# Patient Record
Sex: MALE | Race: WHITE | Employment: FULL TIME | URBAN - METROPOLITAN AREA
[De-identification: names, ages, dates, MRNs, and addresses within clinical notes are randomized per-mention and may not be internally consistent; named-entity substitution may affect disease eponyms.]

---

## 2020-09-26 ENCOUNTER — LAB ENCOUNTER (OUTPATIENT)
Dept: LAB | Age: 60
End: 2020-09-26
Attending: ORTHOPAEDIC SURGERY
Payer: COMMERCIAL

## 2020-09-26 DIAGNOSIS — Z01.818 PRE-OP TESTING: ICD-10-CM

## 2020-09-26 LAB
ANTIBODY SCREEN: NEGATIVE
RH BLOOD TYPE: POSITIVE

## 2020-09-26 PROCEDURE — 86900 BLOOD TYPING SEROLOGIC ABO: CPT

## 2020-09-26 PROCEDURE — 86901 BLOOD TYPING SEROLOGIC RH(D): CPT

## 2020-09-26 PROCEDURE — 86850 RBC ANTIBODY SCREEN: CPT

## 2020-09-27 LAB — SARS-COV-2 RNA RESP QL NAA+PROBE: NOT DETECTED

## 2020-09-28 NOTE — H&P
[address] [place] [address] Patient Status:  Outpatient in a Bed    1960 MRN [id number]   Location [place] [address] Attending [address] [facility] Day # 0 PCP No primary care pro cooperative  Extremities: He walks with a markedly antalgic gait on the left knee. He has a varus deformity of 5 degrees.   He has full extension 125 degrees of flexion with a negative Lockman's test.  There is no collateral instability but his varus defor

## 2020-09-29 ENCOUNTER — ANESTHESIA EVENT (OUTPATIENT)
Dept: SURGERY | Facility: HOSPITAL | Age: 60
End: 2020-09-29
Payer: COMMERCIAL

## 2020-09-29 ENCOUNTER — HOSPITAL ENCOUNTER (OUTPATIENT)
Facility: HOSPITAL | Age: 60
Discharge: HOME | End: 2020-09-30
Attending: ORTHOPAEDIC SURGERY | Admitting: ORTHOPAEDIC SURGERY
Payer: COMMERCIAL

## 2020-09-29 ENCOUNTER — ANESTHESIA (OUTPATIENT)
Dept: SURGERY | Facility: HOSPITAL | Age: 60
End: 2020-09-29
Payer: COMMERCIAL

## 2020-09-29 ENCOUNTER — APPOINTMENT (OUTPATIENT)
Dept: GENERAL RADIOLOGY | Facility: HOSPITAL | Age: 60
End: 2020-09-29
Attending: PHYSICIAN ASSISTANT
Payer: COMMERCIAL

## 2020-09-29 DIAGNOSIS — Z01.818 PRE-OP TESTING: Primary | ICD-10-CM

## 2020-09-29 PROCEDURE — 99202 OFFICE O/P NEW SF 15 MIN: CPT | Performed by: HOSPITALIST

## 2020-09-29 PROCEDURE — 73560 X-RAY EXAM OF KNEE 1 OR 2: CPT | Performed by: PHYSICIAN ASSISTANT

## 2020-09-29 PROCEDURE — 0SRD0L9 REPLACEMENT OF LEFT KNEE JOINT WITH MEDIAL UNICONDYLAR SYNTHETIC SUBSTITUTE, CEMENTED, OPEN APPROACH: ICD-10-PCS | Performed by: ORTHOPAEDIC SURGERY

## 2020-09-29 DEVICE — UNI KNEE CAP PRICING: Type: IMPLANTABLE DEVICE | Status: FUNCTIONAL

## 2020-09-29 DEVICE — REFOBACIN BC R 1X40 US: Type: IMPLANTABLE DEVICE | Site: KNEE | Status: FUNCTIONAL

## 2020-09-29 RX ORDER — HYDROMORPHONE HYDROCHLORIDE 1 MG/ML
0.2 INJECTION, SOLUTION INTRAMUSCULAR; INTRAVENOUS; SUBCUTANEOUS EVERY 5 MIN PRN
Status: DISCONTINUED | OUTPATIENT
Start: 2020-09-29 | End: 2020-09-29 | Stop reason: HOSPADM

## 2020-09-29 RX ORDER — DIPHENHYDRAMINE HYDROCHLORIDE 50 MG/ML
25 INJECTION INTRAMUSCULAR; INTRAVENOUS ONCE AS NEEDED
Status: ACTIVE | OUTPATIENT
Start: 2020-09-29 | End: 2020-09-29

## 2020-09-29 RX ORDER — DOCUSATE SODIUM 100 MG/1
100 CAPSULE, LIQUID FILLED ORAL 2 TIMES DAILY
Status: DISCONTINUED | OUTPATIENT
Start: 2020-09-29 | End: 2020-09-30

## 2020-09-29 RX ORDER — MELATONIN
325
Status: DISCONTINUED | OUTPATIENT
Start: 2020-09-29 | End: 2020-09-29

## 2020-09-29 RX ORDER — ASPIRIN 325 MG
325 TABLET ORAL 2 TIMES DAILY
Status: DISCONTINUED | OUTPATIENT
Start: 2020-09-29 | End: 2020-09-30

## 2020-09-29 RX ORDER — HYDROCODONE BITARTRATE AND ACETAMINOPHEN 5; 325 MG/1; MG/1
2 TABLET ORAL AS NEEDED
Status: COMPLETED | OUTPATIENT
Start: 2020-09-29 | End: 2020-09-29

## 2020-09-29 RX ORDER — HYDROCODONE BITARTRATE AND ACETAMINOPHEN 5; 325 MG/1; MG/1
2 TABLET ORAL EVERY 4 HOURS PRN
Status: DISCONTINUED | OUTPATIENT
Start: 2020-09-29 | End: 2020-09-30

## 2020-09-29 RX ORDER — ONDANSETRON 2 MG/ML
4 INJECTION INTRAMUSCULAR; INTRAVENOUS EVERY 4 HOURS PRN
Status: DISCONTINUED | OUTPATIENT
Start: 2020-09-29 | End: 2020-09-30

## 2020-09-29 RX ORDER — LIDOCAINE HYDROCHLORIDE 10 MG/ML
INJECTION, SOLUTION INFILTRATION; PERINEURAL
Status: COMPLETED | OUTPATIENT
Start: 2020-09-29 | End: 2020-09-29

## 2020-09-29 RX ORDER — MAGNESIUM HYDROXIDE 1200 MG/15ML
LIQUID ORAL CONTINUOUS PRN
Status: COMPLETED | OUTPATIENT
Start: 2020-09-29 | End: 2020-09-29

## 2020-09-29 RX ORDER — HYDROMORPHONE HYDROCHLORIDE 1 MG/ML
0.4 INJECTION, SOLUTION INTRAMUSCULAR; INTRAVENOUS; SUBCUTANEOUS EVERY 5 MIN PRN
Status: DISCONTINUED | OUTPATIENT
Start: 2020-09-29 | End: 2020-09-29 | Stop reason: HOSPADM

## 2020-09-29 RX ORDER — BUPIVACAINE HYDROCHLORIDE 7.5 MG/ML
INJECTION, SOLUTION INTRASPINAL
Status: COMPLETED | OUTPATIENT
Start: 2020-09-29 | End: 2020-09-29

## 2020-09-29 RX ORDER — CELECOXIB 200 MG/1
200 CAPSULE ORAL EVERY 12 HOURS SCHEDULED
Qty: 60 CAPSULE | Refills: 0 | Status: SHIPPED | OUTPATIENT
Start: 2020-09-29 | End: 2021-06-25

## 2020-09-29 RX ORDER — NALOXONE HYDROCHLORIDE 0.4 MG/ML
80 INJECTION, SOLUTION INTRAMUSCULAR; INTRAVENOUS; SUBCUTANEOUS AS NEEDED
Status: ACTIVE | OUTPATIENT
Start: 2020-09-29 | End: 2020-09-29

## 2020-09-29 RX ORDER — VANCOMYCIN HYDROCHLORIDE
15 ONCE
Status: DISCONTINUED | OUTPATIENT
Start: 2020-09-29 | End: 2020-09-29

## 2020-09-29 RX ORDER — DIPHENHYDRAMINE HYDROCHLORIDE 50 MG/ML
12.5 INJECTION INTRAMUSCULAR; INTRAVENOUS EVERY 4 HOURS PRN
Status: DISCONTINUED | OUTPATIENT
Start: 2020-09-29 | End: 2020-09-30

## 2020-09-29 RX ORDER — HYDROCODONE BITARTRATE AND ACETAMINOPHEN 5; 325 MG/1; MG/1
1 TABLET ORAL EVERY 4 HOURS PRN
Qty: 50 TABLET | Refills: 0 | Status: SHIPPED | OUTPATIENT
Start: 2020-09-29 | End: 2020-10-15

## 2020-09-29 RX ORDER — POLYETHYLENE GLYCOL 3350 17 G/17G
17 POWDER, FOR SOLUTION ORAL DAILY PRN
Status: DISCONTINUED | OUTPATIENT
Start: 2020-09-29 | End: 2020-09-30

## 2020-09-29 RX ORDER — MELATONIN
325
Qty: 10 TABLET | Refills: 0 | Status: SHIPPED | OUTPATIENT
Start: 2020-09-29

## 2020-09-29 RX ORDER — CEFAZOLIN SODIUM/WATER 2 G/20 ML
2 SYRINGE (ML) INTRAVENOUS ONCE
Status: COMPLETED | OUTPATIENT
Start: 2020-09-29 | End: 2020-09-29

## 2020-09-29 RX ORDER — GABAPENTIN 600 MG/1
600 TABLET ORAL ONCE
Status: COMPLETED | OUTPATIENT
Start: 2020-09-29 | End: 2020-09-29

## 2020-09-29 RX ORDER — BISACODYL 10 MG
10 SUPPOSITORY, RECTAL RECTAL
Status: DISCONTINUED | OUTPATIENT
Start: 2020-09-29 | End: 2020-09-30

## 2020-09-29 RX ORDER — FAMOTIDINE 20 MG/1
20 TABLET ORAL ONCE
Status: DISCONTINUED | OUTPATIENT
Start: 2020-09-29 | End: 2020-09-29

## 2020-09-29 RX ORDER — PHENYLEPHRINE HCL 10 MG/ML
VIAL (ML) INJECTION AS NEEDED
Status: DISCONTINUED | OUTPATIENT
Start: 2020-09-29 | End: 2020-09-29 | Stop reason: SURG

## 2020-09-29 RX ORDER — MIDAZOLAM HYDROCHLORIDE 1 MG/ML
INJECTION INTRAMUSCULAR; INTRAVENOUS AS NEEDED
Status: DISCONTINUED | OUTPATIENT
Start: 2020-09-29 | End: 2020-09-29 | Stop reason: SURG

## 2020-09-29 RX ORDER — PSEUDOEPHEDRINE HCL 30 MG
100 TABLET ORAL 2 TIMES DAILY
Qty: 20 CAPSULE | Refills: 0 | Status: SHIPPED | OUTPATIENT
Start: 2020-09-29

## 2020-09-29 RX ORDER — MORPHINE SULFATE 4 MG/ML
4 INJECTION, SOLUTION INTRAMUSCULAR; INTRAVENOUS EVERY 10 MIN PRN
Status: DISCONTINUED | OUTPATIENT
Start: 2020-09-29 | End: 2020-09-29 | Stop reason: HOSPADM

## 2020-09-29 RX ORDER — MORPHINE SULFATE 10 MG/ML
6 INJECTION, SOLUTION INTRAMUSCULAR; INTRAVENOUS EVERY 10 MIN PRN
Status: DISCONTINUED | OUTPATIENT
Start: 2020-09-29 | End: 2020-09-29 | Stop reason: HOSPADM

## 2020-09-29 RX ORDER — PROCHLORPERAZINE EDISYLATE 5 MG/ML
10 INJECTION INTRAMUSCULAR; INTRAVENOUS EVERY 6 HOURS PRN
Status: DISCONTINUED | OUTPATIENT
Start: 2020-09-29 | End: 2020-09-30

## 2020-09-29 RX ORDER — ONDANSETRON 2 MG/ML
INJECTION INTRAMUSCULAR; INTRAVENOUS AS NEEDED
Status: DISCONTINUED | OUTPATIENT
Start: 2020-09-29 | End: 2020-09-29 | Stop reason: SURG

## 2020-09-29 RX ORDER — DEXAMETHASONE SODIUM PHOSPHATE 4 MG/ML
VIAL (ML) INJECTION AS NEEDED
Status: DISCONTINUED | OUTPATIENT
Start: 2020-09-29 | End: 2020-09-29 | Stop reason: SURG

## 2020-09-29 RX ORDER — ASPIRIN 325 MG
325 TABLET ORAL 2 TIMES DAILY
Qty: 60 TABLET | Refills: 0 | Status: SHIPPED | OUTPATIENT
Start: 2020-09-29

## 2020-09-29 RX ORDER — FAMOTIDINE 20 MG/1
20 TABLET ORAL 2 TIMES DAILY
Status: DISCONTINUED | OUTPATIENT
Start: 2020-09-29 | End: 2020-09-30

## 2020-09-29 RX ORDER — HYDROCODONE BITARTRATE AND ACETAMINOPHEN 5; 325 MG/1; MG/1
1 TABLET ORAL AS NEEDED
Status: COMPLETED | OUTPATIENT
Start: 2020-09-29 | End: 2020-09-29

## 2020-09-29 RX ORDER — SODIUM PHOSPHATE, DIBASIC AND SODIUM PHOSPHATE, MONOBASIC 7; 19 G/133ML; G/133ML
1 ENEMA RECTAL ONCE AS NEEDED
Status: DISCONTINUED | OUTPATIENT
Start: 2020-09-29 | End: 2020-09-30

## 2020-09-29 RX ORDER — DIPHENHYDRAMINE HCL 25 MG
25 CAPSULE ORAL EVERY 4 HOURS PRN
Status: DISCONTINUED | OUTPATIENT
Start: 2020-09-29 | End: 2020-09-30

## 2020-09-29 RX ORDER — CELECOXIB 200 MG/1
200 CAPSULE ORAL EVERY 12 HOURS SCHEDULED
Status: DISCONTINUED | OUTPATIENT
Start: 2020-09-29 | End: 2020-09-30

## 2020-09-29 RX ORDER — BUPIVACAINE HYDROCHLORIDE AND EPINEPHRINE 5; 5 MG/ML; UG/ML
INJECTION, SOLUTION PERINEURAL AS NEEDED
Status: DISCONTINUED | OUTPATIENT
Start: 2020-09-29 | End: 2020-09-29 | Stop reason: HOSPADM

## 2020-09-29 RX ORDER — METOCLOPRAMIDE 10 MG/1
10 TABLET ORAL ONCE
Status: DISCONTINUED | OUTPATIENT
Start: 2020-09-29 | End: 2020-09-29

## 2020-09-29 RX ORDER — SODIUM CHLORIDE, SODIUM LACTATE, POTASSIUM CHLORIDE, CALCIUM CHLORIDE 600; 310; 30; 20 MG/100ML; MG/100ML; MG/100ML; MG/100ML
INJECTION, SOLUTION INTRAVENOUS CONTINUOUS
Status: DISCONTINUED | OUTPATIENT
Start: 2020-09-29 | End: 2020-09-30

## 2020-09-29 RX ORDER — FAMOTIDINE 10 MG/ML
20 INJECTION, SOLUTION INTRAVENOUS 2 TIMES DAILY
Status: DISCONTINUED | OUTPATIENT
Start: 2020-09-29 | End: 2020-09-30

## 2020-09-29 RX ORDER — HYDROMORPHONE HYDROCHLORIDE 1 MG/ML
0.6 INJECTION, SOLUTION INTRAMUSCULAR; INTRAVENOUS; SUBCUTANEOUS EVERY 5 MIN PRN
Status: DISCONTINUED | OUTPATIENT
Start: 2020-09-29 | End: 2020-09-29 | Stop reason: HOSPADM

## 2020-09-29 RX ORDER — SENNOSIDES 8.6 MG
17.2 TABLET ORAL NIGHTLY
Status: DISCONTINUED | OUTPATIENT
Start: 2020-09-29 | End: 2020-09-30

## 2020-09-29 RX ORDER — HYDROMORPHONE HYDROCHLORIDE 1 MG/ML
INJECTION, SOLUTION INTRAMUSCULAR; INTRAVENOUS; SUBCUTANEOUS AS NEEDED
Status: DISCONTINUED | OUTPATIENT
Start: 2020-09-29 | End: 2020-09-29 | Stop reason: SURG

## 2020-09-29 RX ORDER — CELECOXIB 200 MG/1
400 CAPSULE ORAL ONCE
Status: COMPLETED | OUTPATIENT
Start: 2020-09-29 | End: 2020-09-29

## 2020-09-29 RX ORDER — ACETAMINOPHEN 325 MG/1
650 TABLET ORAL EVERY 4 HOURS PRN
Status: DISCONTINUED | OUTPATIENT
Start: 2020-09-29 | End: 2020-09-30

## 2020-09-29 RX ORDER — TRANEXAMIC ACID 10 MG/ML
1000 INJECTION, SOLUTION INTRAVENOUS ONCE
Status: COMPLETED | OUTPATIENT
Start: 2020-09-29 | End: 2020-09-29

## 2020-09-29 RX ORDER — MORPHINE SULFATE 4 MG/ML
2 INJECTION, SOLUTION INTRAMUSCULAR; INTRAVENOUS EVERY 10 MIN PRN
Status: DISCONTINUED | OUTPATIENT
Start: 2020-09-29 | End: 2020-09-29 | Stop reason: HOSPADM

## 2020-09-29 RX ORDER — ONDANSETRON 2 MG/ML
4 INJECTION INTRAMUSCULAR; INTRAVENOUS ONCE AS NEEDED
Status: DISCONTINUED | OUTPATIENT
Start: 2020-09-29 | End: 2020-09-29 | Stop reason: HOSPADM

## 2020-09-29 RX ORDER — ACETAMINOPHEN 500 MG
1000 TABLET ORAL ONCE
Status: COMPLETED | OUTPATIENT
Start: 2020-09-29 | End: 2020-09-29

## 2020-09-29 RX ORDER — HYDROCODONE BITARTRATE AND ACETAMINOPHEN 5; 325 MG/1; MG/1
1 TABLET ORAL EVERY 4 HOURS PRN
Status: DISCONTINUED | OUTPATIENT
Start: 2020-09-29 | End: 2020-09-30

## 2020-09-29 RX ADMIN — PHENYLEPHRINE HCL 100 MCG: 10 MG/ML VIAL (ML) INJECTION at 09:49:00

## 2020-09-29 RX ADMIN — ONDANSETRON 4 MG: 2 INJECTION INTRAMUSCULAR; INTRAVENOUS at 09:47:00

## 2020-09-29 RX ADMIN — SODIUM CHLORIDE, SODIUM LACTATE, POTASSIUM CHLORIDE, CALCIUM CHLORIDE: 600; 310; 30; 20 INJECTION, SOLUTION INTRAVENOUS at 10:13:00

## 2020-09-29 RX ADMIN — PHENYLEPHRINE HCL 100 MCG: 10 MG/ML VIAL (ML) INJECTION at 09:44:00

## 2020-09-29 RX ADMIN — SODIUM CHLORIDE, SODIUM LACTATE, POTASSIUM CHLORIDE, CALCIUM CHLORIDE: 600; 310; 30; 20 INJECTION, SOLUTION INTRAVENOUS at 10:14:00

## 2020-09-29 RX ADMIN — HYDROMORPHONE HYDROCHLORIDE 0.5 MG: 1 INJECTION, SOLUTION INTRAMUSCULAR; INTRAVENOUS; SUBCUTANEOUS at 08:52:00

## 2020-09-29 RX ADMIN — CEFAZOLIN SODIUM/WATER 2 G: 2 G/20 ML SYRINGE (ML) INTRAVENOUS at 08:25:00

## 2020-09-29 RX ADMIN — BUPIVACAINE HYDROCHLORIDE 1.6 ML: 7.5 INJECTION, SOLUTION INTRASPINAL at 08:05:00

## 2020-09-29 RX ADMIN — LIDOCAINE HYDROCHLORIDE 5 ML: 10 INJECTION, SOLUTION INFILTRATION; PERINEURAL at 08:05:00

## 2020-09-29 RX ADMIN — MIDAZOLAM HYDROCHLORIDE 2 MG: 1 INJECTION INTRAMUSCULAR; INTRAVENOUS at 08:03:00

## 2020-09-29 RX ADMIN — DEXAMETHASONE SODIUM PHOSPHATE 4 MG: 4 MG/ML VIAL (ML) INJECTION at 08:53:00

## 2020-09-29 RX ADMIN — PHENYLEPHRINE HCL 100 MCG: 10 MG/ML VIAL (ML) INJECTION at 09:08:00

## 2020-09-29 RX ADMIN — TRANEXAMIC ACID 1000 MG: 10 INJECTION, SOLUTION INTRAVENOUS at 08:30:00

## 2020-09-29 RX ADMIN — PHENYLEPHRINE HCL 100 MCG: 10 MG/ML VIAL (ML) INJECTION at 09:42:00

## 2020-09-29 NOTE — BRIEF OP NOTE
Pre-Operative Diagnosis: osteoarthritis medial compartment  left knee     Post-Operative Diagnosis: same     Procedure Performed:   Procedure(s):  unicondylar left knee arthroplasty    Surgeon(s) and Role:     * Argelia Chow MD - Primary    Assi

## 2020-09-29 NOTE — OR PREOP
Called into patient room to listen to noise made by  Knee when patient bending. When patient bending knee sound occurred and sounded like running water. This sound was loud enough to be heard by spouse on other side of the room.   Called into the OR and re

## 2020-09-29 NOTE — ANESTHESIA PREPROCEDURE EVALUATION
Anesthesia PreOp Note    HPI:     Slime Melara is a 61year old male who presents for preoperative consultation requested by: Carrie Fleming MD    Date of Surgery: 9/29/2020    Procedure(s):  KNEE TOTAL REPLACEMENT  Indication: osteoarthritis Once, Faye Nails MD, Last Rate: 250 mL/hr at 09/29/20 0752, 1,000 mg at 09/29/20 0752    No current [facility]-ordered outpatient medications on file.       No Known Allergies    Family History   Problem Relation Age of Onset   • Heart Disorder Fathe kg/m². height is 1.88 m (6' 2\") and weight is 86.2 kg (190 lb). His oral temperature is 97.2 °F (36.2 °C). His blood pressure is 142/79 and his pulse is 61. His respiration is 16 and oxygen saturation is 100%.     09/25/20  1652 09/29/20  0723   BP:  142 anesthetic plan, benefits, risks including possible dental damage if relevant, major complications, and any alternative forms of anesthetic management. All of the patient's questions were answered to the best of my ability.  The patient desires the anesth

## 2020-09-29 NOTE — ANESTHESIA PROCEDURE NOTES
Airway  Date/Time: 9/29/2020 8:44 AM  Urgency: elective    Airway not difficult    General Information and Staff    Patient location during procedure: OR  Anesthesiologist: Maryam Booker MD  Performed: anesthesiologist     Indications and Patient Condi

## 2020-09-29 NOTE — OPERATIVE REPORT
[facility]    PATIENT'S NAME: Bruna John   ATTENDING PHYSICIAN: Rosetta Soares MD   OPERATING PHYSICIAN: Rosetta Soares MD   PATIENT ACCOUNT#:   [de-identified]    LOCATION:  [facility] [address] PACU [address] [place] [address]  MEDICAL RECORD #:   [id number] distal femoral cut was then made bringing the extension gap to 15 mm. The flexion gap was adjusted with the file to 9 mm. A size 5 femoral component was chosen and the cuts made for it. A size 4 tibial component was chosen. Trial was performed.   With t

## 2020-09-29 NOTE — OR PREOP
Patient voided a trickle but states \"I did not feel it\". Discussed spinal and that bladder nerves are last to get sensation and voiding sensation can take several hours. Answered all patient questions.  Will continue to attempt to void and make further ev

## 2020-09-29 NOTE — ANESTHESIA POSTPROCEDURE EVALUATION
Patient: Dixon Zapata    Procedure Summary     Date: 09/29/20 Room / Location: [facility] OR  / [facility] OR    Anesthesia Start: 1932 Anesthesia Stop: 8623    Procedure: KNEE TOTAL REPLACEMENT (Left ) Diagnosis: (osteoarthritis left knee)    Surgeons:

## 2020-09-29 NOTE — ANESTHESIA PROCEDURE NOTES
Spinal Block    Date/Time: 9/29/2020 8:05 AM  Performed by: Meme Monahan MD  Authorized by: Meme Monahan MD       General Information and Staff    Start Time:  9/29/2020 8:05 AM  End Time:  9/29/2020 8:10 AM  Anesthesiologist:  Meme Monahan,

## 2020-09-29 NOTE — PHYSICAL THERAPY NOTE
PHYSICAL THERAPY KNEE EVALUATION - INPATIENT       Room Number: [facility]  Evaluation Date: 9/29/2020  Type of Evaluation: Initial  Physician Order: PT Eval and Treat    Presenting Problem: L TKA  Reason for Therapy: Mobility Dysfunction and Discharg clicks' Inpatient Basic Mobility Short Form. Research supports that patients with this level of impairment may benefit from home with outpatient PT. No further IP physical therapy services needed.        DISCHARGE RECOMMENDATIONS  PT Discharge Recommendati does the patient currently have. ..  -   Turning over in bed (including adjusting bedclothes, sheets and blankets)?: None   -   Sitting down on and standing up from a chair with arms (e.g., wheelchair, bedside commode, etc.): A Little   -   Moving from lyin

## 2020-09-29 NOTE — OR PREOP
1200: Discussed plan of care with patient and spouse. Discussed diet, incentive spirometer, medication prescriptions, spinal anesthesia and need to void before discharge, PT/OT, and answered all questions.  Also discussed plan of care if patient is not read

## 2020-09-29 NOTE — OCCUPATIONAL THERAPY NOTE
OCCUPATIONAL THERAPY EVALUATION - INPATIENT      [facility]  Evaluation Date: 9/29/2020  Type of Evaluation: Initial  Presenting Problem: L uni-compartmental knee replacement, WBAT    Physician Order: IP Consult to Occupational Therapy  R • Back problem    • Hearing impairment     wears hearing aides   • Osteoarthritis    • Visual impairment        Past Surgical History  Past Surgical History:   Procedure Laterality Date   • ARTHROSCOPY, SHOULDER, SURGI Left     bicep tendon repair   • TO 32.79%  Standardized Score (AM-PAC Scale): 44.27  CMS Modifier (G-Code): CJ    FUNCTIONAL TRANSFER ASSESSMENT  Supine to Sit : Supervision  Sit to Stand: Contact guard assist  Toilet Transfer: Reviewed proper technique  Shower Transfer: Pt has a shower sea

## 2020-09-30 VITALS
RESPIRATION RATE: 18 BRPM | HEIGHT: 74 IN | OXYGEN SATURATION: 95 % | SYSTOLIC BLOOD PRESSURE: 117 MMHG | DIASTOLIC BLOOD PRESSURE: 66 MMHG | HEART RATE: 64 BPM | WEIGHT: 190 LBS | BODY MASS INDEX: 24.38 KG/M2 | TEMPERATURE: 98 F

## 2020-09-30 PROCEDURE — 99214 OFFICE O/P EST MOD 30 MIN: CPT | Performed by: HOSPITALIST

## 2020-09-30 RX ORDER — MORPHINE SULFATE 2 MG/ML
2 INJECTION, SOLUTION INTRAMUSCULAR; INTRAVENOUS EVERY 2 HOUR PRN
Status: DISCONTINUED | OUTPATIENT
Start: 2020-09-30 | End: 2020-09-30

## 2020-09-30 NOTE — PLAN OF CARE
Patient doing well overall. Pain managed with 2 norco q 4 hours prn, trying to stay on schedule with q 4 hours d/t constant pain at this time. Tolerating diet, denies nausea. Voiding WNL. Surgical dressings CDI. Denies numbness or tingling to LLE.  Ambulati Incision(s), wounds(s) or drain site(s) healing without S/S of infection  Description: INTERVENTIONS:  - Assess and document risk factors for pressure ulcer development  - Assess and document skin integrity  - Assess and document dressing/incision, wound b Monitor for opioid side effects  - Notify MD/LIP if interventions unsuccessful or patient reports new pain  - Anticipate increased pain with activity and pre-medicate as appropriate  Outcome: Adequate for Discharge     Problem: SAFETY ADULT - FALL  Goal: F

## 2020-09-30 NOTE — PLAN OF CARE
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 Once arrived to the floor he was able to urinate without issue. Oral medication and ivp for breakthrough given. He plans to discharge home once medically cleared.

## 2020-09-30 NOTE — PHYSICAL THERAPY NOTE
PHYSICAL THERAPY KNEE TREATMENT NOTE - INPATIENT     Room Number: 434/434-A             Presenting Problem: L medial unicompartmental arthroplasty    Problem List  Active Problems:    Pre-op testing      PHYSICAL THERAPY ASSESSMENT     Consulted w/ FAVIO rich BASIC MOBILITY  How much difficulty does the patient currently have. ..  -   Turning over in bed (including adjusting bedclothes, sheets and blankets)?: A Little   -   Sitting down on and standing up from a chair with arms (e.g., wheelchair, bedside commode

## 2020-09-30 NOTE — PROGRESS NOTES
[facility]    Progress Note    Silver Springkinjal Thorntona Patient Status:  Observation    1960 MRN [id number]   Location [facility] 4W/SW/SE Attending Valeri Herrera MD   Hosp Day # 0 PCP No primary care provider on file.        Subj (CST): Narinder Chamberlain MD on 9/29/2020 at 11:46 AM     Finalized by (CST): Narinder Chamberlain MD on 9/29/2020 at 11:48 AM                  Assessment and Plan:     Bladder outlet obstruction  Likely secondary to recent spinal anesthesia, continue to monit

## 2020-09-30 NOTE — DISCHARGE SUMMARY
[facility]    Discharge Summary    Dixon Zapata Patient Status:  Outpatient in a Bed    1960 MRN [id number]   Location [facility] 4W/SW/SE Attending Narendra Sheehan MD   Hosp Day # 0 PCP No primary care provider on file. Orders    HYDROcodone-acetaminophen 5-325 MG Oral Tab  Take 1 tablet by mouth every 4 (four) hours as needed. aspirin 325 MG Oral Tab  Take 1 tablet (325 mg total) by mouth 2 (two) times daily.     celecoxib 200 MG Oral Cap  Take 1 capsule (200 mg total) on AA TID   Quantity: 5 g  Refills: 3     valACYclovir HCl 1 G Tabs  Commonly known as: Valtrex      Take 2 tabs at onset of symptoms and 2 tabs twelve hours later.    Quantity: 4 tablet  Refills: 4           Where to Get Your Medications      Please pick u

## 2020-09-30 NOTE — CM/SW NOTE
09/30/20 1100   CM/SW Screening   Referral [facility] staff; Chart review;Nursing rounds   Patient lives with Spouse   PT rec is home with outpt PT. Per chart review pt lives with spouse. No dc needs identified.     Shannan Diaz

## 2020-10-15 ENCOUNTER — TELEPHONE (OUTPATIENT)
Dept: ORTHOPEDICS CLINIC | Facility: CLINIC | Age: 60
End: 2020-10-15

## 2020-10-15 RX ORDER — HYDROCODONE BITARTRATE AND ACETAMINOPHEN 5; 325 MG/1; MG/1
1 TABLET ORAL EVERY 4 HOURS PRN
Qty: 50 TABLET | Refills: 0 | Status: SHIPPED | OUTPATIENT
Start: 2020-10-15

## 2021-06-25 RX ORDER — CELECOXIB 200 MG/1
CAPSULE ORAL
Qty: 60 CAPSULE | Refills: 0 | Status: SHIPPED | OUTPATIENT
Start: 2021-06-25

## 2021-06-30 RX ORDER — CELECOXIB 200 MG/1
CAPSULE ORAL
Qty: 180 CAPSULE | Refills: 0 | OUTPATIENT
Start: 2021-06-30

## 2021-06-30 NOTE — TELEPHONE ENCOUNTER
Left total knee replacement with Dr. Richard  9/29/20  No OV notes with this office. NO F/U scheduled  Last fill 6/25/21 x 1 month supply to the same as requesting pharmacy. Seems refill request is too soon. Pharmacy currently closed. Will call later.

## 2021-06-30 NOTE — TELEPHONE ENCOUNTER
I called the pharmacy. Confirmed med was refilled yesterday. Duplicate request for refill cancelled.

## 2023-07-05 ENCOUNTER — LAB ENCOUNTER (OUTPATIENT)
Dept: LAB | Facility: HOSPITAL | Age: 63
End: 2023-07-05
Attending: ORTHOPAEDIC SURGERY
Payer: COMMERCIAL

## 2023-07-05 DIAGNOSIS — Z96.649 PAIN DUE TO TOTAL HIP REPLACEMENT, INITIAL ENCOUNTER: Primary | ICD-10-CM

## 2023-07-05 LAB
CRP SERPL-MCNC: <0.29 MG/DL (ref ?–0.3)
ERYTHROCYTE [SEDIMENTATION RATE] IN BLOOD: 6 MM/HR

## 2023-07-05 PROCEDURE — 36415 COLL VENOUS BLD VENIPUNCTURE: CPT

## 2023-07-05 PROCEDURE — 83015 HEAVY METAL QUAL ANY NUMBER: CPT

## 2023-07-05 PROCEDURE — 83018 HEAVY METAL QUAN EACH NES: CPT

## 2023-07-05 PROCEDURE — 82495 ASSAY OF CHROMIUM: CPT

## 2023-07-05 PROCEDURE — 86140 C-REACTIVE PROTEIN: CPT

## 2023-07-05 PROCEDURE — 85652 RBC SED RATE AUTOMATED: CPT

## 2023-07-07 LAB — COBALT: 4.5 UG/L

## 2024-03-21 RX ORDER — CREATINE 100 %
1 POWDER (GRAM) MISCELLANEOUS DAILY
COMMUNITY

## 2024-03-21 RX ORDER — ACETAMINOPHEN 500 MG
1000 TABLET ORAL 2 TIMES DAILY
COMMUNITY
End: 2024-03-26

## 2024-03-21 RX ORDER — MELOXICAM 15 MG/1
15 TABLET ORAL DAILY
COMMUNITY

## 2024-03-22 NOTE — DISCHARGE INSTRUCTIONS
Remove dressings on Thursday, leave steri strips in place.    Ice for 20 minutes at a time and elevate as needed.    Crutches as needed.  Weightbearing as tolerated.       HOME INSTRUCTIONS  AMBSURG HOME CARE INSTRUCTIONS: POST-OP ANESTHESIA  The medication that you received for sedation or general anesthesia can last up to 24 hours. Your judgment and reflexes may be altered, even if you feel like your normal self.      We Recommend:   Do not drive any motor vehicle or bicycle   Avoid mowing the lawn, playing sports, or working with power tools/applicances (power saws, electric knives or mixers)   That you have someone stay with you on your first night home   Do not drink alcohol or take sleeping pills or tranquilizers   Do not sign legal documents within 24 hours of your procedure   If you had a nerve block for your surgery, take extra care not to put any pressure on your arm or hand for 24 hours    It is normal:  For you to have a sore throat if you had a breathing tube during surgery (while you were asleep!). The sore throat should get better within 48 hours. You can gargle with warm salt water (1/2 tsp in 4 oz warm water) or use a throat lozenge for comfort  To feel muscle aches or soreness especially in the abdomen, chest or neck. The achy feeling should go away in the next 24 hours  To feel weak, sleepy or \"wiped out\". Your should start feeling better in the next 24 hours.   To experience mild discomforts such as sore lip or tongue, headache, cramps, gas pains or a bloated feeling in your abdomen.   To experience mild back pain or soreness for a day or two if you had spinal or epidural anesthesia.   If you had laparoscopic surgery, to feel shoulder pain or discomfort on the day of surgery.   For some patients to have nausea after surgery/anesthesia    If you feel nausea or experience vomiting:   Try to move around less.   Eat less than usual or drink only liquids until the next morning   Nausea should resolve  in about 24 hours    If you have a problem when you are at home:    Call your surgeons office   Discharge Instructions: After Your Surgery  You’ve just had surgery. During surgery, you were given medicine called anesthesia to keep you relaxed and free of pain. After surgery, you may have some pain or nausea. This is common. Here are some tips for feeling better and getting well after surgery.   Going home  Your healthcare provider will show you how to take care of yourself when you go home. They'll also answer your questions. Have an adult family member or friend drive you home. For the first 24 hours after your surgery:   Don't drive or use heavy equipment.  Don't make important decisions or sign legal papers.  Take medicines as directed.  Don't drink alcohol.  Have someone stay with you, if needed. They can watch for problems and help keep you safe.  Be sure to go to all follow-up visits with your healthcare provider. And rest after your surgery for as long as your provider tells you to.   Coping with pain  If you have pain after surgery, pain medicine will help you feel better. Take it as directed, before pain becomes severe. Also, ask your healthcare provider or pharmacist about other ways to control pain. This might be with heat, ice, or relaxation. And follow any other instructions your surgeon or nurse gives you.      Stay on schedule with your medicine.     Tips for taking pain medicine  To get the best relief possible, remember these points:   Pain medicines can upset your stomach. Taking them with a little food may help.  Most pain relievers taken by mouth need at least 20 to 30 minutes to start to work.  Don't wait till your pain becomes severe before you take your medicine. Try to time your medicine so that you can take it before starting an activity. This might be before you get dressed, go for a walk, or sit down for dinner.  Constipation is a common side effect of some pain medicines. Call your  healthcare provider before taking any medicines such as laxatives or stool softeners to help ease constipation. Also ask if you should skip any foods. Drinking lots of fluids and eating foods such as fruits and vegetables that are high in fiber can also help. Remember, don't take laxatives unless your surgeon has prescribed them.  Drinking alcohol and taking pain medicine can cause dizziness and slow your breathing. It can even be deadly. Don't drink alcohol while taking pain medicine.  Pain medicine can make you react more slowly to things. Don't drive or run machinery while taking pain medicine.  Your healthcare provider may tell you to take acetaminophen to help ease your pain. Ask them how much you're supposed to take each day. Acetaminophen or other pain relievers may interact with your prescription medicines or other over-the-counter (OTC) medicines. Some prescription medicines have acetaminophen and other ingredients in them. Using both prescription and OTC acetaminophen for pain can cause you to accidentally overdose. Read the labels on your OTC medicines with care. This will help you to clearly know the list of ingredients, how much to take, and any warnings. It may also help you not take too much acetaminophen. If you have questions or don't understand the information, ask your pharmacist or healthcare provider to explain it to you before you take the OTC medicine.   Managing nausea  Some people have an upset stomach (nausea) after surgery. This is often because of anesthesia, pain, or pain medicine, less movement of food in the stomach, or the stress of surgery. These tips will help you handle nausea and eat healthy foods as you get better. If you were on a special food plan before surgery, ask your healthcare provider if you should follow it while you get better. Check with your provider on how your eating should progress. It may depend on the surgery you had. These general tips may help:   Don't push  yourself to eat. Your body will tell you when to eat and how much.  Start off with clear liquids and soup. They're easier to digest.  Next try semi-solid foods as you feel ready. These include mashed potatoes, applesauce, and gelatin.  Slowly move to solid foods. Don’t eat fatty, rich, or spicy foods at first.  Don't force yourself to have 3 large meals a day. Instead eat smaller amounts more often.  Take pain medicines with a small amount of solid food, such as crackers or toast. This helps prevent nausea.  When to call your healthcare provider  Call your healthcare provider right away if you have any of these:   You still have too much pain, or the pain gets worse, after taking the medicine. The medicine may not be strong enough. Or there may be a complication from the surgery.  You feel too sleepy, dizzy, or groggy. The medicine may be too strong.  Side effects such as nausea or vomiting. Your healthcare provider may advise taking other medicines to .  Skin changes such as rash, itching, or hives. This may mean you have an allergic reaction. Your provider may advise taking other medicines.  The incision looks different (for instance, part of it opens up).  Bleeding or fluid leaking from the incision site, and weren't told to expect that.  Fever of 100.4°F (38°C) or higher, or as directed by your provider.  Call 911  Call 911 right away if you have:   Trouble breathing  Facial swelling    If you have obstructive sleep apnea   You were given anesthesia medicine during surgery to keep you comfortable and free of pain. After surgery, you may have more apnea spells because of this medicine and other medicines you were given. The spells may last longer than normal.    At home:  Keep using the continuous positive airway pressure (CPAP) device when you sleep. Unless your healthcare provider tells you not to, use it when you sleep, day or night. CPAP is a common device used to treat obstructive sleep apnea.  Talk with  your provider before taking any pain medicine, muscle relaxants, or sedatives. Your provider will tell you about the possible dangers of taking these medicines.  Contact your provider if your sleeping changes a lot even when taking medicines as directed.  Dory last reviewed this educational content on 10/1/2021  © 9233-4295 [employer]. All rights reserved. This information is not intended as a substitute for professional medical care. Always follow your healthcare professional's instructions.

## 2024-03-25 NOTE — H&P
[facility]  [facility] of [facility]    History & Physical    Gerard Avelar Patient Status:  Hospital Outpatient Surgery    1960 MRN [id number]   Location [facility] OPERATING ROOM Attending Jose Chow, *   Hosp Day # 0 PCP No primary care provider on file.     Date:  3/25/2024  Date of Admission:  (Not on file)    History provided by:patient  Chief Complaint:   Right knee pain    HPI:   Gerard Avelar is a(n) 63 year old male.  He has had pain in the knee for several months.  He has been using meloxicam and had a cortisone injection in November.  He had an injury in January where his knee buckled and he was unable to get up for several minutes.  He has not had another episode.  His pain is along the medial joint line, but is different from how his left knee felt prior to his unicondylar knee replacement.    History     Past Medical History:   Diagnosis Date    Back problem     BPH (benign prostatic hyperplasia)     Hearing impairment     wears hearing aids    Osteoarthritis     Visual impairment      Past Surgical History:   Procedure Laterality Date    ARTHROSCOPY, SHOULDER, SURGI Left     bicep tendon repair    COLONOSCOPY      CYSTOURETHROSCOPY W/ TRANSURETHRAL RESECTION/INCISION E      TOTAL HIP REPLACEMENT Bilateral     TOTAL KNEE REPLACEMENT Left     partial     Family History   Problem Relation Age of Onset    Heart Disorder Father     Psychiatric Mother         dementia     Social History:  Social History     Socioeconomic History    Marital status:    Tobacco Use    Smoking status: Never    Smokeless tobacco: Never   Substance and Sexual Activity    Alcohol use: Yes     Comment: 3 drinks daily, beer or liquor    Drug use: Not Currently     Types: Cannabis     Comment: rarely     Allergies/Medications:   Allergies: No Known Allergies  No medications prior to admission.       Review of Systems:   Pertinent items are noted in HPI.    Physical Exam:    Vital Signs:  Height 6' 2\" (1.88 m), weight 183 lb (83 kg).     General appearance: alert, appears stated age and cooperative  Extremities: No effusion of the right knee.  Mild to moderate varus deformity.  Negative Lachman's test.  No collateral instability.  Medial joint line tenderness.  Positive Denis's test          Results:     Lab Results   Component Value Date    HGB 14.3 09/29/2020    HCT 42.5 09/29/2020    INR 1.6 (L) 10/02/2006    ESRML 6 07/05/2023    CRP <0.29 07/05/2023     Standing AP both knees lateral and skyline views of the right knee demonstrate moderate Lihi advanced osteoarthritis of the knee with narrowing of the medial joint space with very early osteophyte formation.  This perhaps has progressed slightly since previous x-rays done on June 22, 2023.  No lytic or blastic lesions.  Minimal patellofemoral arthritis with small traction osteophyte at the superior aspect of the patella.  MRI demonstrates a tear of the medial meniscus as well as bone marrow edema which is most likely degenerative.    No results found.        Assessment/Plan:   We discussed options for treatment including nonoperative treatment and surgery which would be arthroscopic meniscectomy and some chondroplasty.  He would like to proceed with surgery.  We discussed the expected risks and benefits.  I answered all of his questions.  We will proceed as scheduled.      COSME BOBBY PA-C  3/25/2024

## 2024-03-26 ENCOUNTER — ANESTHESIA (OUTPATIENT)
Dept: SURGERY | Facility: HOSPITAL | Age: 64
End: 2024-03-26
Payer: COMMERCIAL

## 2024-03-26 ENCOUNTER — HOSPITAL ENCOUNTER (OUTPATIENT)
Facility: HOSPITAL | Age: 64
Setting detail: HOSPITAL OUTPATIENT SURGERY
Discharge: HOME | End: 2024-03-26
Attending: ORTHOPAEDIC SURGERY | Admitting: ORTHOPAEDIC SURGERY
Payer: COMMERCIAL

## 2024-03-26 ENCOUNTER — ANESTHESIA EVENT (OUTPATIENT)
Dept: SURGERY | Facility: HOSPITAL | Age: 64
End: 2024-03-26
Payer: COMMERCIAL

## 2024-03-26 ENCOUNTER — APPOINTMENT (OUTPATIENT)
Dept: GENERAL RADIOLOGY | Facility: HOSPITAL | Age: 64
End: 2024-03-26
Attending: ORTHOPAEDIC SURGERY
Payer: COMMERCIAL

## 2024-03-26 VITALS
RESPIRATION RATE: 18 BRPM | BODY MASS INDEX: 23.49 KG/M2 | HEART RATE: 56 BPM | HEIGHT: 74 IN | WEIGHT: 183 LBS | TEMPERATURE: 97 F | DIASTOLIC BLOOD PRESSURE: 83 MMHG | OXYGEN SATURATION: 99 % | SYSTOLIC BLOOD PRESSURE: 165 MMHG

## 2024-03-26 DIAGNOSIS — G89.18 POSTOPERATIVE PAIN: Primary | ICD-10-CM

## 2024-03-26 PROCEDURE — 76000 FLUOROSCOPY <1 HR PHYS/QHP: CPT | Performed by: ORTHOPAEDIC SURGERY

## 2024-03-26 PROCEDURE — 0SBC4ZZ EXCISION OF RIGHT KNEE JOINT, PERCUTANEOUS ENDOSCOPIC APPROACH: ICD-10-PCS | Performed by: ORTHOPAEDIC SURGERY

## 2024-03-26 DEVICE — IMPLANTABLE DEVICE
Type: IMPLANTABLE DEVICE | Site: KNEE | Status: FUNCTIONAL
Brand: SCP® PF KNEE KIT

## 2024-03-26 RX ORDER — LIDOCAINE HYDROCHLORIDE 10 MG/ML
INJECTION, SOLUTION EPIDURAL; INFILTRATION; INTRACAUDAL; PERINEURAL AS NEEDED
Status: DISCONTINUED | OUTPATIENT
Start: 2024-03-26 | End: 2024-03-26 | Stop reason: SURG

## 2024-03-26 RX ORDER — CEFAZOLIN SODIUM/WATER 2 G/20 ML
2 SYRINGE (ML) INTRAVENOUS ONCE
Status: COMPLETED | OUTPATIENT
Start: 2024-03-26 | End: 2024-03-26

## 2024-03-26 RX ORDER — MORPHINE SULFATE 4 MG/ML
4 INJECTION, SOLUTION INTRAMUSCULAR; INTRAVENOUS EVERY 10 MIN PRN
Status: DISCONTINUED | OUTPATIENT
Start: 2024-03-26 | End: 2024-03-26

## 2024-03-26 RX ORDER — HYDROMORPHONE HYDROCHLORIDE 1 MG/ML
0.6 INJECTION, SOLUTION INTRAMUSCULAR; INTRAVENOUS; SUBCUTANEOUS EVERY 5 MIN PRN
Status: DISCONTINUED | OUTPATIENT
Start: 2024-03-26 | End: 2024-03-26

## 2024-03-26 RX ORDER — HYDROCODONE BITARTRATE AND ACETAMINOPHEN 7.5; 325 MG/1; MG/1
1 TABLET ORAL EVERY 6 HOURS PRN
Status: DISCONTINUED | OUTPATIENT
Start: 2024-03-26 | End: 2024-03-26

## 2024-03-26 RX ORDER — DEXAMETHASONE SODIUM PHOSPHATE 4 MG/ML
VIAL (ML) INJECTION AS NEEDED
Status: DISCONTINUED | OUTPATIENT
Start: 2024-03-26 | End: 2024-03-26 | Stop reason: SURG

## 2024-03-26 RX ORDER — HYDROMORPHONE HYDROCHLORIDE 1 MG/ML
0.4 INJECTION, SOLUTION INTRAMUSCULAR; INTRAVENOUS; SUBCUTANEOUS EVERY 5 MIN PRN
Status: DISCONTINUED | OUTPATIENT
Start: 2024-03-26 | End: 2024-03-26

## 2024-03-26 RX ORDER — NALOXONE HYDROCHLORIDE 0.4 MG/ML
0.08 INJECTION, SOLUTION INTRAMUSCULAR; INTRAVENOUS; SUBCUTANEOUS AS NEEDED
Status: DISCONTINUED | OUTPATIENT
Start: 2024-03-26 | End: 2024-03-26

## 2024-03-26 RX ORDER — MIDAZOLAM HYDROCHLORIDE 1 MG/ML
INJECTION INTRAMUSCULAR; INTRAVENOUS AS NEEDED
Status: DISCONTINUED | OUTPATIENT
Start: 2024-03-26 | End: 2024-03-26 | Stop reason: SURG

## 2024-03-26 RX ORDER — MORPHINE SULFATE 10 MG/ML
6 INJECTION, SOLUTION INTRAMUSCULAR; INTRAVENOUS EVERY 10 MIN PRN
Status: DISCONTINUED | OUTPATIENT
Start: 2024-03-26 | End: 2024-03-26

## 2024-03-26 RX ORDER — MORPHINE SULFATE 4 MG/ML
2 INJECTION, SOLUTION INTRAMUSCULAR; INTRAVENOUS EVERY 10 MIN PRN
Status: DISCONTINUED | OUTPATIENT
Start: 2024-03-26 | End: 2024-03-26

## 2024-03-26 RX ORDER — HYDROCODONE BITARTRATE AND ACETAMINOPHEN 5; 325 MG/1; MG/1
1 TABLET ORAL ONCE
Status: COMPLETED | OUTPATIENT
Start: 2024-03-26 | End: 2024-03-26

## 2024-03-26 RX ORDER — SODIUM CHLORIDE, SODIUM LACTATE, POTASSIUM CHLORIDE, CALCIUM CHLORIDE 600; 310; 30; 20 MG/100ML; MG/100ML; MG/100ML; MG/100ML
INJECTION, SOLUTION INTRAVENOUS CONTINUOUS
Status: DISCONTINUED | OUTPATIENT
Start: 2024-03-26 | End: 2024-03-26

## 2024-03-26 RX ORDER — EPHEDRINE SULFATE 50 MG/ML
INJECTION INTRAVENOUS AS NEEDED
Status: DISCONTINUED | OUTPATIENT
Start: 2024-03-26 | End: 2024-03-26 | Stop reason: SURG

## 2024-03-26 RX ORDER — ONDANSETRON 2 MG/ML
INJECTION INTRAMUSCULAR; INTRAVENOUS AS NEEDED
Status: DISCONTINUED | OUTPATIENT
Start: 2024-03-26 | End: 2024-03-26 | Stop reason: SURG

## 2024-03-26 RX ORDER — ONDANSETRON 2 MG/ML
4 INJECTION INTRAMUSCULAR; INTRAVENOUS EVERY 6 HOURS PRN
Status: DISCONTINUED | OUTPATIENT
Start: 2024-03-26 | End: 2024-03-26

## 2024-03-26 RX ORDER — HYDROCODONE BITARTRATE AND ACETAMINOPHEN 7.5; 325 MG/1; MG/1
1 TABLET ORAL EVERY 6 HOURS PRN
Qty: 40 TABLET | Refills: 0 | Status: SHIPPED | OUTPATIENT
Start: 2024-03-26

## 2024-03-26 RX ORDER — ACETAMINOPHEN 500 MG
1000 TABLET ORAL ONCE
Status: DISCONTINUED | OUTPATIENT
Start: 2024-03-26 | End: 2024-03-26 | Stop reason: HOSPADM

## 2024-03-26 RX ORDER — HYDROMORPHONE HYDROCHLORIDE 1 MG/ML
0.2 INJECTION, SOLUTION INTRAMUSCULAR; INTRAVENOUS; SUBCUTANEOUS EVERY 5 MIN PRN
Status: DISCONTINUED | OUTPATIENT
Start: 2024-03-26 | End: 2024-03-26

## 2024-03-26 RX ORDER — PROCHLORPERAZINE EDISYLATE 5 MG/ML
5 INJECTION INTRAMUSCULAR; INTRAVENOUS EVERY 8 HOURS PRN
Status: DISCONTINUED | OUTPATIENT
Start: 2024-03-26 | End: 2024-03-26

## 2024-03-26 RX ADMIN — SODIUM CHLORIDE, SODIUM LACTATE, POTASSIUM CHLORIDE, CALCIUM CHLORIDE: 600; 310; 30; 20 INJECTION, SOLUTION INTRAVENOUS at 12:08:00

## 2024-03-26 RX ADMIN — EPHEDRINE SULFATE 10 MG: 50 INJECTION INTRAVENOUS at 12:25:00

## 2024-03-26 RX ADMIN — MIDAZOLAM HYDROCHLORIDE 2 MG: 1 INJECTION INTRAMUSCULAR; INTRAVENOUS at 12:13:00

## 2024-03-26 RX ADMIN — ONDANSETRON 4 MG: 2 INJECTION INTRAMUSCULAR; INTRAVENOUS at 12:13:00

## 2024-03-26 RX ADMIN — DEXAMETHASONE SODIUM PHOSPHATE 4 MG: 4 MG/ML VIAL (ML) INJECTION at 12:13:00

## 2024-03-26 RX ADMIN — EPHEDRINE SULFATE 10 MG: 50 INJECTION INTRAVENOUS at 12:45:00

## 2024-03-26 RX ADMIN — CEFAZOLIN SODIUM/WATER 2 G: 2 G/20 ML SYRINGE (ML) INTRAVENOUS at 12:10:00

## 2024-03-26 RX ADMIN — LIDOCAINE HYDROCHLORIDE 50 MG: 10 INJECTION, SOLUTION EPIDURAL; INFILTRATION; INTRACAUDAL; PERINEURAL at 12:13:00

## 2024-03-26 RX ADMIN — SODIUM CHLORIDE, SODIUM LACTATE, POTASSIUM CHLORIDE, CALCIUM CHLORIDE: 600; 310; 30; 20 INJECTION, SOLUTION INTRAVENOUS at 13:21:00

## 2024-03-26 NOTE — ANESTHESIA PREPROCEDURE EVALUATION
Anesthesia PreOp Note    HPI:     Gerard Avelar is a 63 year old male who presents for preoperative consultation requested by: Jose Chow MD    Date of Surgery: 3/26/2024    Procedure(s):  Right knee arthroscopy, partial medial meniscectomy and chondroplasty of medial femoral condyle, and medial tibial plateau  Indication: Right medial meniscal tear, bone marrow edema    Relevant Problems   No relevant active problems       NPO:  Last Liquid Consumption Date: 03/25/24  Last Liquid Consumption Time: 2100  Last Solid Consumption Date: 03/25/24  Last Solid Consumption Time: 1700  Last Liquid Consumption Date: 03/25/24          History Review:  Patient Active Problem List    Diagnosis Date Noted    Pre-op testing     Contact dermatitis and other eczema, due to unspecified cause 02/05/2015    History of nonmelanoma skin cancer 02/05/2015       Past Medical History:   Diagnosis Date    Back problem     BPH (benign prostatic hyperplasia)     Hearing impairment     wears hearing aids    Osteoarthritis     Visual impairment        Past Surgical History:   Procedure Laterality Date    ARTHROSCOPY, SHOULDER, SURGI Left     bicep tendon repair    COLONOSCOPY      CYSTOURETHROSCOPY W/ TRANSURETHRAL RESECTION/INCISION E      TOTAL HIP REPLACEMENT Bilateral     TOTAL KNEE REPLACEMENT Left     partial       Medications Prior to Admission   Medication Sig Dispense Refill Last Dose    acetaminophen 500 MG Oral Tab Take 2 tablets (1,000 mg total) by mouth in the morning and 2 tablets (1,000 mg total) before bedtime.   3/26/2024 at 0830    Meloxicam 15 MG Oral Tab Take 1 tablet (15 mg total) by mouth daily.   Past Week    Creatine Does not apply Powder Take 1 Scoop by mouth daily.   3/25/2024     Current Facility-Administered Medications Ordered in Epic   Medication Dose Route Frequency Provider Last Rate Last Admin    lactated ringers infusion   Intravenous Continuous Jose Chow MD 20 mL/hr at 03/26/24  1025 New Bag at 03/26/24 1025    acetaminophen (Tylenol Extra Strength) tab 1,000 mg  1,000 mg Oral Once Jose Chow MD        ceFAZolin (Ancef) 2 g in 20mL IV syringe premix  2 g Intravenous Once Jose Chow MD         No current Epic-ordered outpatient medications on file.       No Known Allergies    Family History   Problem Relation Age of Onset    Heart Disorder Father     Psychiatric Mother         dementia     Social History     Socioeconomic History    Marital status:    Tobacco Use    Smoking status: Never    Smokeless tobacco: Never   Substance and Sexual Activity    Alcohol use: Yes     Comment: 3 drinks daily, beer or liquor    Drug use: Not Currently     Types: Cannabis     Comment: rarely       Available pre-op labs reviewed.             Vital Signs:  Body mass index is 23.5 kg/m².   height is 1.88 m (6' 2\") and weight is 83 kg (183 lb). His oral temperature is 97.9 °F (36.6 °C). His blood pressure is 145/79 and his pulse is 62. His respiration is 16 and oxygen saturation is 95%.   Vitals:    03/21/24 1014 03/26/24 1010   BP:  145/79   Pulse:  62   Resp:  16   Temp:  97.9 °F (36.6 °C)   TempSrc:  Oral   SpO2:  95%   Weight: 83 kg (183 lb)    Height: 1.88 m (6' 2\")         Anesthesia Evaluation     Patient summary reviewed and Nursing notes reviewed    No history of anesthetic complications   Airway   Mallampati: I  TM distance: >3 FB  Neck ROM: full  Dental - Dentition appears grossly intact     Pulmonary - negative ROS and normal exam   Cardiovascular - negative ROS and normal exam  Exercise tolerance: good    Neuro/Psych - negative ROS     GI/Hepatic/Renal - negative ROS     Comments: BPH    Endo/Other - negative ROS   (+) arthritis  Abdominal  - normal exam                 Anesthesia Plan:   ASA:  2  Plan:   General  Airway:  LMA  Post-op Pain Management: IV analgesics and Oral pain medication  Informed Consent Plan and Risks Discussed With:  Patient      I have  informed Gerard Avelar of the nature of the anesthetic plan, benefits, risks including possible dental damage if relevant, major complications, and any alternative forms of anesthetic management.   All of the patient's questions were answered to the best of my ability. The patient desires the anesthetic management as planned.  CHASITY BERTRAND MD  3/26/2024 10:44 AM  Present on Admission:  **None**

## 2024-03-26 NOTE — OPERATIVE REPORT
[facility]    PATIENT'S NAME: MARY FREEMAN   ATTENDING PHYSICIAN: Jose Chow MD   OPERATING PHYSICIAN: Jose Chow MD   PATIENT ACCOUNT#:   [id number]    LOCATION:  [facility] PACU 3 [facility] 10  MEDICAL RECORD #:   [id number]       YOB: 1960  ADMISSION DATE:       03/26/2024      OPERATION DATE:  03/26/2024    OPERATIVE REPORT    ####EDITING MAY BE REQUIRED####    PREOPERATIVE DIAGNOSIS:  Internal derangement, right knee, including tear of the medial meniscus and subchondral insufficiency fracture of the proximal medial tibia and bone marrow edema of the distal medial femoral condyle.  POSTOPERATIVE DIAGNOSIS:  ____  PROCEDURE:  Arthroscopy, partial medial meniscectomy, and Subchondroplasty with C-arm control.    ASSISTANT:  Maikel Rm PA-C    INDICATIONS:  The patient is a 63-year-old man who has had symptoms of pain in the medial compartment of his right knee that have not responded to conservative management.  Preoperative workup included x-rays and MRI scanning demonstrating the above findings.  After a discussion of the options of treatment, he requested the above procedure.  Our discussion included, but was not limited to, potential risk of anesthetic complication, infection, DVT, persistent postoperative pain, potential need for future reconstructive surgery such as unicondylar knee replacement as he had on the other side, etc.  Written consent was obtained.    OPERATIVE TECHNIQUE:  The patient was brought to the operating room and placed under general anesthesia.  Examination under anesthesia revealed negative Lachman test with no collateral instability.  There was some crepitus with motion.  After usual sterile prep and drape, superolateral, inferomedial, and lateral arthroscopic portals were established.  There was moderate synovitis in the knee.  The suprapatellar pouch and medial and lateral gutters were free of loose bodies.  There was an area of  grade 4 chondromalacia in the medial facet of the patella with area of exposed bone.  The lateral facet was intact.  There were some corresponding degenerative changes in the trochlea.  The medial compartment demonstrated grade 4 changes with exposed bone over a large area of the medial femoral condyle and a smaller corresponding area of the medial tibial plateau.  There was a degenerative-type tear of the medial meniscus with an unstable flap posteriorly.  A partial meniscectomy was performed, removing the torn fragments of the meniscus, leaving a smooth stable rim.  Some of the delaminated articular cartilage was gently smoothed but minimal shaving of the cartilage was done.  The ACL and PCL were normal.  The lateral compartment demonstrated normal tibial and femoral articular surfaces and a normal lateral meniscus.  At this point, the scope was removed from the knee and the C-arm was brought in.  A trocar was placed in the medial femoral condyle and a second trocar was placed in the medial tibial plateau in the corresponding areas of maximal bone marrow edema referenced on the MRI scan which was viewed intraoperatively.  Subchondroplasty was performed using the C-arm.  Three syringes of hydroxyapatite were injected in the medial femoral condyle and 2 syringes of hydroxyapatite were injected in the tibial plateau.  We removed the trocars after leaving sufficient time for the chondroplasty material to harden, and I checked the joint arthroscopically to make sure that there was no hydroxyapatite material within the joint.  The wounds were then closed in routine fashion and sterile dressings were applied.  There were no intraoperative complications.  The patient was stable under the care of Anesthesia at the completion of the procedure.     Dictated By Jose Chow MD  d: 03/26/2024 13:14:32  t: 03/26/2024 13:46:25  [facility] [id number]  [facility]/

## 2024-03-26 NOTE — ANESTHESIA POSTPROCEDURE EVALUATION
Patient: Gerard Avelar    Procedure Summary       Date: 03/26/24 Room / Location: [facility] MAIN OR  / [facility] MAIN OR    Anesthesia Start: 1208 Anesthesia Stop: 1329    Procedure: Right knee arthroscopy, partial medial meniscectomy and subchondroplasty of medial femoral condyle, and medial tibial plateau (Right: Knee) Diagnosis: (Right medial meniscal tear, bone marrow edema)    Surgeons: Jose Chow MD Anesthesiologist: Chasity Mccrary MD    Anesthesia Type: general ASA Status: 2            Anesthesia Type: general    Vitals Value Taken Time   /97 03/26/24 1328   Temp 97.4 °F (36.3 °C) 03/26/24 1328   Pulse 66 03/26/24 1329   Resp 11 03/26/24 1329   SpO2 98 % 03/26/24 1328   Vitals shown include unfiled device data.    [facility] AN Post Evaluation:   Patient Evaluated in PACU  Patient Participation: complete - patient participated  Level of Consciousness: awake and alert  Pain Score: 0  Pain Management: adequate  Airway Patency:patent  Yes    Nausea/Vomiting: none  Cardiovascular Status: acceptable  Respiratory Status: acceptable  Postoperative Hydration acceptable      CHASITY MCCRARY MD  3/26/2024 1:30 PM

## 2024-03-26 NOTE — ANESTHESIA PROCEDURE NOTES
Airway  Date/Time: 3/26/2024 12:14 PM  Urgency: Elective    Airway not difficult    General Information and Staff    Patient location during procedure: OR  Anesthesiologist: Bonilla Mccrary MD  Performed: anesthesiologist   Performed by: Bonilla Mccrary MD  Authorized by: Bonilla Mccrary MD      Indications and Patient Condition  Indications for airway management: anesthesia  Spontaneous ventilation: present  Sedation level: deep  Preoxygenated: yes  Patient position: sniffing  Mask difficulty assessment: 1 - vent by mask    Final Airway Details  Final airway type: supraglottic airway      Successful airway: classic  Size 4  Airway Seal Pressure (cm H2O): 20       Number of attempts at approach: 1  Number of other approaches attempted: 0

## 2024-03-26 NOTE — INTERVAL H&P NOTE
Pre-op Diagnosis: Right medial meniscal tear, bone marrow edema    The above referenced H&P was reviewed by DEEPA PAEZ MD on 3/26/2024, the patient was examined and no significant changes have occurred in the patient's condition since the H&P was performed.  I discussed with the patient and/or legal representative the potential benefits, risks and side effects of this procedure; the likelihood of the patient achieving goals; and potential problems that might occur during recuperation.  I discussed reasonable alternatives to the procedure, including risks, benefits and side effects related to the alternatives and risks related to not receiving this procedure.  We will proceed with procedure as planned.

## 2024-03-26 NOTE — BRIEF OP NOTE
Pre-Operative Diagnosis: Right medial meniscal tear, bone marrow edema     Post-Operative Diagnosis: Right medial meniscal tear, bone marrow edema      Procedure Performed:   Right knee arthroscopy, partial medial meniscectomy and subchondroplasty of medial femoral condyle, and medial tibial plateau    Surgeon(s) and Role:     * Deepa Chow MD - Primary    Assistant(s):  Surgical Assistant.: Ynes Tam  PA: Maikel Rm PA-C     Surgical Findings: see dx     Specimen: none     Estimated Blood Loss: No data recorded    Dictation Number:  [id number]    DEEPA CHOW MD  3/26/2024  1:11 PM

## 2024-04-15 ENCOUNTER — TELEPHONE (OUTPATIENT)
Dept: ORTHOPEDICS CLINIC | Facility: CLINIC | Age: 64
End: 2024-04-15

## 2024-04-15 DIAGNOSIS — Z47.89 ORTHOPEDIC AFTERCARE: Primary | ICD-10-CM

## 2024-04-15 RX ORDER — HYDROCODONE BITARTRATE AND ACETAMINOPHEN 5; 325 MG/1; MG/1
1 TABLET ORAL EVERY 6 HOURS PRN
Qty: 20 TABLET | Refills: 0 | Status: SHIPPED | OUTPATIENT
Start: 2024-04-15

## (undated) DEVICE — SOLUTION IRRIG 3000ML 0.9% NACL FLX CONT

## (undated) DEVICE — TRI FLAT PIN-MED

## (undated) DEVICE — TOTAL KNEE: Brand: MEDLINE INDUSTRIES, INC.

## (undated) DEVICE — GAMMEX® NON-LATEX PI ORTHO SIZE 8.5, STERILE POLYISOPRENE POWDER-FREE SURGICAL GLOVE: Brand: GAMMEX

## (undated) DEVICE — TUBING PMP L16FT DISP

## (undated) DEVICE — HOOD: Brand: FLYTE

## (undated) DEVICE — CEMENT MIXING SYSTEM WITH FEMORAL BREAKWAY NOZZLE: Brand: REVOLUTION

## (undated) DEVICE — GAMMEX® PI HYBRID SIZE 9, STERILE POWDER-FREE SURGICAL GLOVE, POLYISOPRENE AND NEOPRENE BLEND: Brand: GAMMEX

## (undated) DEVICE — 2T11 #2 PDO 36 X 36: Brand: 2T11 #2 PDO 36 X 36

## (undated) DEVICE — VIOLET BRAIDED (POLYGLACTIN 910), SYNTHETIC ABSORBABLE SUTURE: Brand: COATED VICRYL

## (undated) DEVICE — SUTURE VCRL 4-0 L18IN ABSRB UD L13MM P-3 3/8

## (undated) DEVICE — BATTERY

## (undated) DEVICE — NEEDLE SPINAL 20X3-1/2 YELLOW

## (undated) DEVICE — Device: Brand: ACCUPORT® CANNULA

## (undated) DEVICE — Device: Brand: STABLECUT®

## (undated) DEVICE — GAUZE SPONGES,12 PLY: Brand: CURITY

## (undated) DEVICE — DRAPE ARTHROSCOPY KNEE

## (undated) DEVICE — 75MM TROCAR SMOOTH PIN-D

## (undated) DEVICE — ARTHROSCOPY: Brand: MEDLINE INDUSTRIES, INC.

## (undated) DEVICE — SUCTION CANISTER, 3000CC,SAFELINER: Brand: DEROYAL

## (undated) DEVICE — 3M™ STERI-DRAPE™ U-DRAPE 1015: Brand: STERI-DRAPE™

## (undated) DEVICE — ADHESIVE LIQ 2/3ML VI MASTISOL

## (undated) DEVICE — SOL  .9 3000ML

## (undated) DEVICE — MEDI-VAC NON-CONDUCTIVE SUCTION TUBING: Brand: CARDINAL HEALTH

## (undated) DEVICE — WRAP COOLING KNEE W/ICE PILLOW

## (undated) DEVICE — 60 ML SYRINGE LUER-LOCK TIP: Brand: MONOJECT

## (undated) DEVICE — BLADE SHV L13CM DIA4MM CRV DBL CUT COOLCUT

## (undated) DEVICE — GAMMEX® NON-LATEX PI ORTHO SIZE 9, STERILE POLYISOPRENE POWDER-FREE SURGICAL GLOVE: Brand: GAMMEX

## (undated) DEVICE — CONTAINER SPEC STR 4OZ GRY LID

## (undated) DEVICE — COTTON ROLL: Brand: DEROYAL

## (undated) DEVICE — SUTURE VICRYL 2-0 FS-1

## (undated) DEVICE — DRESSING 10X4IN ANMC SAFETAC

## (undated) DEVICE — BANDAGE ROLL,100% COTTON, 6 PLY, LARGE: Brand: KERLIX

## (undated) DEVICE — INTENDED FOR TISSUE SEPARATION, AND OTHER PROCEDURES THAT REQUIRE A SHARP SURGICAL BLADE TO PUNCTURE OR CUT.: Brand: BARD-PARKER ® STAINLESS STEEL BLADES

## (undated) DEVICE — 2DE14 2-0 PDO 24 X 24: Brand: 2DE14 2-0 PDO 24 X 24

## (undated) DEVICE — BLADE 77.5X11.2MM RECIP SRG

## (undated) DEVICE — CHLORAPREP 26ML APPLICATOR

## (undated) DEVICE — BANDAGE FLXMSTR 11YDX6IN STRL

## (undated) DEVICE — DISPOSABLE TOURNIQUET CUFF SINGLE BLADDER, DUAL PORT AND QUICK CONNECT CONNECTOR: Brand: COLOR CUFF

## (undated) DEVICE — APPLICATOR PREP 26ML CHG 2% ISO ALC 70%

## (undated) DEVICE — GAMMEX® PI HYBRID SIZE 7.5, STERILE POWDER-FREE SURGICAL GLOVE, POLYISOPRENE AND NEOPRENE BLEND: Brand: GAMMEX

## (undated) DEVICE — DERMABOND LIQUID ADHESIVE

## (undated) DEVICE — THREADED PINS PACK: Brand: KNEE INSTRUMENTS

## (undated) DEVICE — BLADE SHV L13CM DIA4MM DBL CUT COOLCUT

## (undated) DEVICE — 3M™ STERI-STRIP™ REINFORCED ADHESIVE SKIN CLOSURES, R1547, 1/2 IN X 4 IN (12 MM X 100 MM), 6 STRIPS/ENVELOPE: Brand: 3M™ STERI-STRIP™

## (undated) DEVICE — DRAPE SHEET LG

## (undated) DEVICE — TRAY FOLEY BDX 16F STATLOCK